# Patient Record
Sex: FEMALE | URBAN - METROPOLITAN AREA
[De-identification: names, ages, dates, MRNs, and addresses within clinical notes are randomized per-mention and may not be internally consistent; named-entity substitution may affect disease eponyms.]

---

## 2018-09-10 ENCOUNTER — OFFICE VISIT (OUTPATIENT)
Dept: NEUROLOGY | Age: 63
End: 2018-09-10
Payer: COMMERCIAL

## 2018-09-10 VITALS
BODY MASS INDEX: 20.53 KG/M2 | WEIGHT: 123.2 LBS | HEIGHT: 65 IN | SYSTOLIC BLOOD PRESSURE: 123 MMHG | DIASTOLIC BLOOD PRESSURE: 68 MMHG | HEART RATE: 58 BPM

## 2018-09-10 DIAGNOSIS — G47.00 INSOMNIA, UNSPECIFIED TYPE: Primary | ICD-10-CM

## 2018-09-10 PROCEDURE — 99204 OFFICE O/P NEW MOD 45 MIN: CPT | Performed by: PSYCHIATRY & NEUROLOGY

## 2018-09-10 RX ORDER — TRAZODONE HYDROCHLORIDE 50 MG/1
TABLET ORAL
Qty: 60 TABLET | Refills: 5 | Status: SHIPPED | OUTPATIENT
Start: 2018-09-10

## 2018-09-10 RX ORDER — ZOLPIDEM TARTRATE 10 MG/1
TABLET ORAL NIGHTLY PRN
COMMUNITY

## 2018-09-10 RX ORDER — RISEDRONATE SODIUM 150 MG/1
TABLET, FILM COATED ORAL
COMMUNITY

## 2018-09-10 RX ORDER — LEVOTHYROXINE SODIUM 88 UG/1
88 TABLET ORAL DAILY
COMMUNITY

## 2018-09-10 ASSESSMENT — ENCOUNTER SYMPTOMS
GASTROINTESTINAL NEGATIVE: 1
ALLERGIC/IMMUNOLOGIC NEGATIVE: 1
RESPIRATORY NEGATIVE: 1
BACK PAIN: 1

## 2018-09-10 NOTE — PROGRESS NOTES
education: N/A     Social History Main Topics    Smoking status: Former Smoker     Quit date: 9/10/1988    Smokeless tobacco: Never Used    Alcohol use No    Drug use: No    Sexual activity: Not Asked     Other Topics Concern    None     Social History Narrative    None       Current Outpatient Prescriptions   Medication Sig Dispense Refill    levothyroxine (SYNTHROID) 88 MCG tablet Take 88 mcg by mouth Daily      zolpidem (AMBIEN) 10 MG tablet Take by mouth nightly as needed for Sleep. [place] Risedronate Sodium 150 MG TABS Take by mouth every 30 days      traZODone (DESYREL) 50 MG tablet Take 1 po qhs x 2 days the 2po qhs 60 tablet 5     No current facility-administered medications for this visit. No Known Allergies    Review of Systems   Constitutional: Negative. HENT: Negative. Eyes: Positive for visual disturbance. Respiratory: Negative. Cardiovascular: Negative. Gastrointestinal: Negative. Endocrine: Negative. Genitourinary: Negative. Musculoskeletal: Positive for back pain. Skin: Negative. Allergic/Immunologic: Negative. Neurological: Negative. Hematological: Negative. Psychiatric/Behavioral: Negative. Objective:   Physical Exam  Vitals:    09/10/18 1600   BP: 123/68   Pulse: 58     weight: 123 lb 3.2 oz (55.9 kg)    Neurological Examination  Constitutional .General exam well groomed   Head/Ears /Nose/Throat: external ear . Normal exam  Neck and thyroid . Normal size. No bruits  Respiratory . Breathsounds clear bilaterally  Cardiovascular:  Auscultation of heart with regular rate and rhythm  Musculoskeletal. Muscle bulk and tone normal                                                           Muscle strength 5/5 strength throughout                                                                                No dysmetria or dysdiadokinesis  No tremor   Normal fine motor  Gait normal   Orientation Alert and oriented x 3   Attention and concentration normal  Short term memory normal  Language process and speech normal . No aphasia   Cranial nerve 2 normal acuety and visual fields  Cranial nerve 3, 4 and 6 . Extraocular muscles are intact . Pupils are equal and reactive   Cranial nerve 5 . Normal strength of masseter and temporalis . Intact corneal reflex. Normal facial sensation  Cranial nerve 7 normal exam   Cranial nerve 8. Grossly intact hearing   Cranial nerve 9 and 10. Symmetric palate elevation   Cranial nerve 11 , 5 out of 5 strength   Cranial Nerve 12 midline tongue . No atrophy  Sensation . Normal proprioception . Intact Vibration . Normal pinprick and light touch   Deep Tendon Reflexes normal  Plantar response flexor bilaterally    Assessment:       Diagnosis Orders   1.  Insomnia, unspecified type     Patient has intiating and maintenance insomnia for which will prescribe desyrel with thereon undergo weaning of ambien therapy       Plan:      As above         Ismael Peguero MD

## 2018-09-12 NOTE — COMMUNICATION BODY
Subjective:      Patient ID: Rajni Boss is a 58 y.o. female. HPI  The condition is she reports trouble falling and staying asleep for the past 20 years  She reports this began when she was working midnight shift sleeping during the day having trouble falling and staying asleep . When she reverted back to night sleep schedule she retained insomnia . She has been has been on Caden Push for past 5 years . She will go to bed at 10 PM falling asleep with 30 minutes on ambien sleeping straight through to 3:30 to 4 AM unable to fall back asleep. She will go to work at 7:45 AM . If she does not take Caden Push she reports that she will feel like she has someone on her chest unable to sleep . Tehre is no snoring wit no apnea . There osvaldo be no choking or gasping in her sleep . There is no restless legs or kicking in her sleep. There is daytime fatigue . There will be fatigue on awakening in the morning which will persist through the day. There is no history of depression or low mood. There is anxiety at night if she can not fall asleep . In the past she has da on tylenol PM or drinking alcohol to fall asleep or melatonin which were not effective. Over the last 2 months twice per week she will get squiggly lines in visual field of left eye lasting for 20 minutes followed by headache over vertex frontal head of mild to moderate degree . She will drink 4 cups of coffee per day all before 10 AM . She does not drink at this time or smoke. Significant medications ambien 5 mg po qhs . Past Medical History:   Diagnosis Date    Hyperlipidemia     Hypothyroidism     Insomnia     Mitral valve prolapse     Osteoporosis        History reviewed. No pertinent surgical history.     Family History   Problem Relation Age of Onset    Hypertension Mother     Hypertension Father     Heart Disease Father        Social History     Social History    Marital status: Unknown     Spouse name: N/A    Number of children: N/A    Years of